# Patient Record
Sex: MALE | Race: WHITE | Employment: STUDENT | URBAN - NONMETROPOLITAN AREA
[De-identification: names, ages, dates, MRNs, and addresses within clinical notes are randomized per-mention and may not be internally consistent; named-entity substitution may affect disease eponyms.]

---
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
## 2021-01-05 RX ORDER — SYRING-NEEDL,DISP,INSUL,0.3 ML 31GX15/64"
SYRINGE, EMPTY DISPOSABLE MISCELLANEOUS
Qty: 100 EACH | Refills: 11 | Status: SHIPPED | OUTPATIENT
Start: 2021-01-05

## 2021-01-05 NOTE — TELEPHONE ENCOUNTER
Received fax from pharmacy requesting refill on pts medication(s). Pt was last seen in office on 12/9/2020  and has a follow up scheduled for 3/10/2021. Will send request to  Dr. Froilan Newton  for patient.      Requested Prescriptions     Pending Prescriptions Disp Refills    BD VEO INSULIN SYR U/F 1/2UNIT 31G X 15/64\" 0.3 ML MISC [Pharmacy Med Name: BD INS SYR 0.3/31G/6MM HU MIS] 100 each 0     Sig: USE AS DIRECTED

## 2021-01-14 ENCOUNTER — TELEPHONE (OUTPATIENT)
Dept: PRIMARY CARE CLINIC | Age: 21
End: 2021-01-14

## 2021-01-14 DIAGNOSIS — E10.9 TYPE 1 DIABETES MELLITUS WITHOUT COMPLICATION, WITH LONG-TERM CURRENT USE OF INSULIN (HCC): ICD-10-CM

## 2021-01-14 RX ORDER — INSULIN DEGLUDEC INJECTION 100 U/ML
23 INJECTION, SOLUTION SUBCUTANEOUS NIGHTLY
Qty: 15 ML | Refills: 5 | Status: SHIPPED | OUTPATIENT
Start: 2021-01-14 | End: 2021-01-15 | Stop reason: CLARIF

## 2021-01-14 NOTE — TELEPHONE ENCOUNTER
Received a denial from [address] [place] [address] on pts Tresiba. This medication cannot be approved because           I will send this to provider for further recommendations.

## 2021-01-15 RX ORDER — INSULIN GLARGINE 100 [IU]/ML
23 INJECTION, SOLUTION SUBCUTANEOUS NIGHTLY
Qty: 9 PEN | Refills: 3 | Status: SHIPPED | OUTPATIENT
Start: 2021-01-15 | End: 2022-03-30

## 2021-01-15 RX ORDER — INSULIN GLARGINE 100 [IU]/ML
23 INJECTION, SOLUTION SUBCUTANEOUS NIGHTLY
Qty: 9 PEN | Refills: 3 | Status: SHIPPED | OUTPATIENT
Start: 2021-01-15 | End: 2021-01-15 | Stop reason: SDUPTHER

## 2021-01-15 NOTE — TELEPHONE ENCOUNTER
We can order Basaglar insulin same number of units as ordered previously with Tresiba/Toujeo etc.  All the basal insulins will be the same dose

## 2021-01-15 NOTE — TELEPHONE ENCOUNTER
Please inquire as to what alternatives would be approved. Any basal insulin is fine with me.   It would be the same number of units daily

## 2021-01-15 NOTE — TELEPHONE ENCOUNTER
Called mother, alternative is    basaglar qwikpen    (just wanted to make sure you were good with that-still same dosage?)

## 2021-02-10 DIAGNOSIS — J45.20 MILD INTERMITTENT ASTHMA WITHOUT COMPLICATION: ICD-10-CM

## 2021-02-10 RX ORDER — MONTELUKAST SODIUM 10 MG/1
10 TABLET ORAL NIGHTLY
Qty: 90 TABLET | Refills: 3 | Status: SHIPPED | OUTPATIENT
Start: 2021-02-10 | End: 2022-03-18

## 2021-02-10 NOTE — TELEPHONE ENCOUNTER
Received fax from pharmacy requesting refill on pts medication(s). Pt was last seen in office on 12/9/2020  and has a follow up scheduled for 3/10/2021. Will send request to  Dr. Christiano Hernandez  for patient.      Requested Prescriptions     Pending Prescriptions Disp Refills    montelukast (SINGULAIR) 10 MG tablet [Pharmacy Med Name: Montelukast Sodium 10 MG Oral Tablet] 30 tablet 0     Sig: TAKE 1 TABLET BY MOUTH ONCE DAILY AT BEDTIME

## 2021-03-07 DIAGNOSIS — F41.9 ANXIETY: ICD-10-CM

## 2021-03-08 RX ORDER — ESCITALOPRAM OXALATE 10 MG/1
10 TABLET ORAL DAILY
Qty: 90 TABLET | Refills: 3 | Status: SHIPPED | OUTPATIENT
Start: 2021-03-08 | End: 2022-03-30 | Stop reason: SDUPTHER

## 2021-03-16 ENCOUNTER — TELEPHONE (OUTPATIENT)
Dept: PRIMARY CARE CLINIC | Age: 21
End: 2021-03-16

## 2021-03-16 DIAGNOSIS — E10.9 TYPE 1 DIABETES MELLITUS WITHOUT COMPLICATION, WITH LONG-TERM CURRENT USE OF INSULIN (HCC): Primary | ICD-10-CM

## 2021-03-16 NOTE — TELEPHONE ENCOUNTER
Mom called, needs lab orders sent to [facility]    Faxed through Epic to [phone] and LM for mom that these were faxed over

## 2021-03-19 NOTE — TELEPHONE ENCOUNTER
Mom called, they went to the lab at [facility] and was told they didn't have his orders. refaxed them again through Epic to [phone]    Called mother and advised her I was sorry about that, that I did send them on Tuesday and refaxed today as well.  Asked her to call and make sure they had them before they went again. (she said it is an hour away from their home)

## 2021-03-29 ENCOUNTER — TELEPHONE (OUTPATIENT)
Dept: PRIMARY CARE CLINIC | Age: 21
End: 2021-03-29

## 2021-03-29 ENCOUNTER — VIRTUAL VISIT (OUTPATIENT)
Dept: PRIMARY CARE CLINIC | Age: 21
End: 2021-03-29
Payer: COMMERCIAL

## 2021-03-29 DIAGNOSIS — J45.20 MILD INTERMITTENT ASTHMA WITHOUT COMPLICATION: ICD-10-CM

## 2021-03-29 DIAGNOSIS — E03.9 ACQUIRED HYPOTHYROIDISM: ICD-10-CM

## 2021-03-29 DIAGNOSIS — E10.9 TYPE 1 DIABETES MELLITUS WITHOUT COMPLICATION (HCC): Primary | ICD-10-CM

## 2021-03-29 PROCEDURE — 99214 OFFICE O/P EST MOD 30 MIN: CPT | Performed by: PEDIATRICS

## 2021-03-29 RX ORDER — INSULIN PUMP CONTROLLER, RF
1 EACH MISCELLANEOUS
Qty: 1 KIT | Refills: 0 | Status: SHIPPED | OUTPATIENT
Start: 2021-03-29 | End: 2021-04-27 | Stop reason: SDUPTHER

## 2021-03-29 ASSESSMENT — ENCOUNTER SYMPTOMS
COUGH: 0
BACK PAIN: 0
DIARRHEA: 0
SHORTNESS OF BREATH: 0
SORE THROAT: 0
VOMITING: 0
WHEEZING: 0
ABDOMINAL PAIN: 0
CHEST TIGHTNESS: 0
NAUSEA: 0

## 2021-03-29 NOTE — TELEPHONE ENCOUNTER
Mom called, he has an appt this afternoon for a VV. He had labs at [facility] last week and will need these for this appt. I called Sarina at [facility] and she is faxing them over if you can please watch for these and get them entered in.

## 2021-03-29 NOTE — PROGRESS NOTES
[facility], [address]  Phone [phone]   Fax [phone]      OFFICE VISIT: 3/29/2021    Susannah Neville Trejo-: 2000      HPI  Reason For Visit:  Juan Antonio Mirza is a 24 y.o. Diabetes    Patient presents via doxy. Me video conferencing on follow-up for type 1 diabetes. He states that he had labs performed at [facility].  Unfortunately, they did not send us any results. We have called for results but we have not yet received them. Diabetes Mellitus Type 1  Diet compliance:  compliant all of the time  Nutrition Consultation Needed:  no  Medication:              NovoLog insulin with carb counting and corrective factor              Tresiba insulin 23 units nightly  Medication compliance:  compliant all of the time  Weight trend: stable  Current exercise: yes - he is very active  Checking: 3-4 times daily  Home blood sugar records: fasting range: well controlled  Low BG:  no  Eye exam current (within one year): yes  Checking Feet regularly:  yes - no sores  ACE/ARB:  no  Aspirin: No:   Tobacco history: He  reports that he has never smoked. He has never used smokeless tobacco.    Lab Results   Component Value Date    LABA1C 9.4 2020    LABA1C 8.4 2020    LABA1C 8.5 (H) 2019     Lab Results   Component Value Date    CREATININE 0.91 2020     He states that he is ready to try an insulin pump. He is wanting to try OmniPod. He already has the [facility] system. He is using that regularly. The Omni pod would allow him to dose his insulin more accurately and more frequently without multiple needle sticks. Present settings are 250 for high and 80 for a low. We will want to bring these down at least from a high standpoint all the way down to around 150. He feels comfortable with his carb counting. He feels comfortable with his corrective factor. We did look at his basal rate today and it does seem to be relatively accurate for him.   We will order his OmniPod insulin pump today. They use : [facility] [address]    [address] [place] [address], [facility] [address]      [phone]    This is where they get his Dexcom through their insurance      Hypothyroidism:  Medication:   Synthroid 112 mcg daily  Medication compliance:  compliant most of the time  Patient is  taking his medication consistently on an empty stomach. Symptoms: none. Laboratory:  Lab Results   Component Value Date    TSH 2.910 12/01/2020    TSH 0.129 02/14/2020    TSH 0.110 (L) 07/27/2019     Lab Results   Component Value Date    T4FREE 1.45 12/01/2020    T4FREE 1.62 02/14/2020    T4FREE 1.74 (H) 07/27/2019        Asthma:  Medication:              Albuterol HFA 2 puffs every 6 hours as needed              Singulair 10 mg nightly              Zyrtec 10 mg daily for allergies  Symptoms: no recent problems.           vitals were not taken for this visit. There is no height or weight on file to calculate BMI.     I have reviewed the following with the Mr. Estelita Olmos   Lab Review  Orders Only on 12/16/2020   Component Date Value    TSH 12/01/2020 2.910     T4 Free 12/01/2020 1.45     WBC 12/01/2020 5.5     RBC 12/01/2020 4.99     Hemoglobin 12/01/2020 14.7     Hematocrit 12/01/2020 45     MCV 12/01/2020 90     MCH 12/01/2020 29.5     MCHC 12/01/2020 32.7     Platelets 51/25/6520 194     RDW 12/01/2020 12.5     Neutrophils % 12/01/2020 53     Lymphocytes % 12/01/2020 35     Monocytes % 12/01/2020 9     Eosinophils % 12/01/2020 2     Basophils % 12/01/2020 1     Neutrophils Absolute 12/01/2020 2.9     Lymphocytes Absolute 12/01/2020 1.9     Monocytes Absolute 12/01/2020 0.5     Eosinophils Absolute 12/01/2020 0.1     Basophils Absolute 12/01/2020 0.0     Sodium 12/01/2020 138     Chloride 12/01/2020 99     Potassium 12/01/2020 4.0     BUN 12/01/2020 7     CREATININE 12/01/2020 0.91     Glucose 12/01/2020 355     AST 12/01/2020 12     ALT 12/01/2020 10     Calcium 12/01/2020 9.6     Total Protein 12/01/2020 7.4     CO2 12/01/2020 24     Albumin 12/01/2020 4.8     Alkaline Phosphatase 12/01/2020 94     Total Bilirubin 12/01/2020 0.5     Gfr Calculated 12/01/2020 122     Cholesterol, Total 12/01/2020 134     HDL 12/01/2020 53     LDL Calculated 12/01/2020 69     Triglycerides 12/01/2020 55     Chol/HDL Ratio 12/01/2020 2.5     VLDL 12/01/2020 12     Microalbumin Creatinine * 12/01/2020 <7     Microalb, Ur 12/01/2020 <3     Creatinine, Urine 12/01/2020 42.9     Hemoglobin A1C 12/01/2020 9.4      Copies of these are in the chart. Current Outpatient Medications   Medication Sig Dispense Refill    escitalopram (LEXAPRO) 10 MG tablet Take 1 tablet by mouth daily 90 tablet 3    montelukast (SINGULAIR) 10 MG tablet Take 1 tablet by mouth nightly 90 tablet 3    insulin glargine (BASAGLAR KWIKPEN) 100 UNIT/ML injection pen Inject 23 Units into the skin nightly 9 pen 3    BD VEO INSULIN SYR U/F 1/2UNIT 31G X 15/64\" 0.3 ML MISC USE AS DIRECTED 100 each 11    Insulin Syringe-Needle U-100 (BD VEO INSULIN SYRINGE U/F) 31G X 15/64\" 0.3 ML MISC Use with insulin 100 each 11    levothyroxine (SYNTHROID) 112 MCG tablet Take 1 tablet by mouth daily 30 tablet 11    insulin aspart (NOVOLOG) 100 UNIT/ML injection vial Inject 20 Units into the skin 3 times daily (before meals) 2 vial 11    albuterol sulfate HFA (VENTOLIN HFA) 108 (90 Base) MCG/ACT inhaler Inhale 2 puffs into the lungs every 6 hours as needed for Wheezing 2 Inhaler 11    cetirizine (ZYRTEC) 10 MG tablet Take 1 tablet by mouth daily 90 tablet 3    FREESTYLE LITE strip USE 1 STRIP TO CHECK GLUCOSE SIX TIMES DAILY 200 each 5    glucagon 1 MG injection Follow package directions for low blood sugar.  1 kit 3    Insulin Syringe-Needle U-100 (BD INSULIN SYRINGE U/F) 31G X 5/16\" 0.3 ML MISC 1 each by Does not apply route daily 300 each 11    glucose monitoring kit (FREESTYLE) monitoring kit 1 kit by Does not apply route daily 1 kit 0    blood glucose monitor strips Test 3 times a day & as needed for symptoms of irregular blood glucose. 100 strip 11    blood glucose test strips (ONE TOUCH ULTRA TEST) strip Inject 1 each into the skin 6 times daily 200 strip 11     No current facility-administered medications for this visit. Allergies: Patient has no known allergies. Past Medical History:   Diagnosis Date    Asthma     Diabetes mellitus ([address]     Hypothyroid     Hypothyroidism (acquired)        Family History   Problem Relation Age of Onset    Diabetes Paternal Grandfather     Diabetes Maternal Grandmother        Past Surgical History:   Procedure Laterality Date    APPENDECTOMY         Social History     Tobacco Use    Smoking status: Never Smoker    Smokeless tobacco: Never Used   Substance Use Topics    Alcohol use: No     Alcohol/week: 0.0 standard drinks        Review of Systems   Constitutional: Negative for chills, fatigue and fever. HENT: Negative for congestion, ear pain and sore throat. Eyes: Negative for visual disturbance. Respiratory: Negative for cough, chest tightness, shortness of breath and wheezing. Cardiovascular: Negative for chest pain, palpitations and leg swelling. Gastrointestinal: Negative for abdominal pain, diarrhea, nausea and vomiting. Endocrine: Negative for polyuria. Bg running a little higher   Genitourinary: Negative for frequency and urgency. Musculoskeletal: Negative for back pain and neck pain. Skin: Negative for rash. Neurological: Negative for dizziness and headaches. Psychiatric/Behavioral: Negative for self-injury. The patient is not nervous/anxious. Physical Exam  Physical exam was not performed as this was a video teleconference visit using doxy. Me      ASSESSMENT      ICD-10-CM    1. Type 1 diabetes mellitus without complication (HCC)  P16.8    2. Acquired hypothyroidism  E03.9    3.  Mild intermittent asthma

## 2021-03-29 NOTE — PATIENT INSTRUCTIONS
Patient Education        Learning About Carbohydrate (Carb) Counting and Eating Out When You Have Diabetes  Why plan your meals? Meal planning can be a key part of managing diabetes. Planning meals and snacks with the right balance of carbohydrate, protein, and fat can help you keep your blood sugar at the target level you set with your doctor. You don't have to eat special foods. You can eat what your family eats, including sweets once in a while. But you do have to pay attention to how often you eat and how much you eat of certain foods. You may want to work with a dietitian or a certified diabetes educator. He or she can give you tips and meal ideas and can answer your questions about meal planning. This health professional can also help you reach a healthy weight if that is one of your goals. What should you know about eating carbs? Managing the amount of carbohydrate (carbs) you eat is an important part of healthy meals when you have diabetes. Carbohydrate is found in many foods. · Learn which foods have carbs. And learn the amounts of carbs in different foods. ? Bread, cereal, pasta, and rice have about 15 grams of carbs in a serving. A serving is 1 slice of bread (1 ounce), ½ cup of cooked cereal, or 1/3 cup of cooked pasta or rice. ? Fruits have 15 grams of carbs in a serving. A serving is 1 small fresh fruit, such as an apple or orange; ½ of a banana; ½ cup of cooked or canned fruit; ½ cup of fruit juice; 1 cup of melon or raspberries; or 2 tablespoons of dried fruit. ? Milk and no-sugar-added yogurt have 15 grams of carbs in a serving. A serving is 1 cup of milk or 2/3 cup of no-sugar-added yogurt. ? Starchy vegetables have 15 grams of carbs in a serving. A serving is ½ cup of mashed potatoes or sweet potato; 1 cup winter squash; ½ of a small baked potato; ½ cup of cooked beans; or ½ cup cooked corn or green peas.   · Learn how much carbs to eat each day and at each meal. A dietitian or CDE can teach you how to keep track of the amount of carbs you eat. This is called carbohydrate counting. · If you are not sure how to count carbohydrate grams, use the Plate Method to plan meals. It is a good, quick way to make sure that you have a balanced meal. It also helps you spread carbs throughout the day. ? Divide your plate by types of foods. Put non-starchy vegetables on half the plate, meat or other protein food on one-quarter of the plate, and a grain or starchy vegetable in the final quarter of the plate. To this you can add a small piece of fruit and 1 cup of milk or yogurt, depending on how many carbs you are supposed to eat at a meal.  · Try to eat about the same amount of carbs at each meal. Do not \"save up\" your daily allowance of carbs to eat at one meal.  · Proteins have very little or no carbs per serving. Examples of proteins are beef, chicken, turkey, fish, eggs, tofu, cheese, cottage cheese, and peanut butter. A serving size of meat is 3 ounces, which is about the size of a deck of cards. Examples of meat substitute serving sizes (equal to 1 ounce of meat) are 1/4 cup of cottage cheese, 1 egg, 1 tablespoon of peanut butter, and ½ cup of tofu. How can you eat out and still eat healthy? · Learn to estimate the serving sizes of foods that have carbohydrate. If you measure food at home, it will be easier to estimate the amount in a serving of restaurant food. · If the meal you order has too much carbohydrate (such as potatoes, corn, or baked beans), ask to have a low-carbohydrate food instead. Ask for a salad or green vegetables. · If you use insulin, check your blood sugar before and after eating out to help you plan how much to eat in the future. · If you eat more carbohydrate at a meal than you had planned, take a walk or do other exercise. This will help lower your blood sugar. What are some tips for eating healthy? · Limit saturated fat, such as the fat from meat and dairy products. This is a healthy choice because people who have diabetes are at higher risk of heart disease. So choose lean cuts of meat and nonfat or low-fat dairy products. Use olive or canola oil instead of butter or shortening when cooking. · Don't skip meals. Your blood sugar may drop too low if you skip meals and take insulin or certain medicines for diabetes. · Check with your doctor before you drink alcohol. Alcohol can cause your blood sugar to drop too low. Alcohol can also cause a bad reaction if you take certain diabetes medicines. Follow-up care is a key part of your treatment and safety. Be sure to make and go to all appointments, and call your doctor if you are having problems. It's also a good idea to know your test results and keep a list of the medicines you take. Where can you learn more? Go to https://Orthomimeticskeshiaeb.ArticleAlley. org and sign in to your [employer] account. Enter L748 in the NetDragon box to learn more about \"Learning About Carbohydrate (Carb) Counting and Eating Out When You Have Diabetes. \"     If you do not have an account, please click on the \"Sign Up Now\" link. Current as of: August 31, 2020               Content Version: 12.8  © 2006-2021 Healthwise, Incorporated. Care instructions adapted under license by [facility] ([facility]). If you have questions about a medical condition or this instruction, always ask your healthcare professional. [place] [address] any warranty or liability for your use of this information.

## 2021-03-31 LAB
ALBUMIN SERPL-MCNC: 4.8 G/DL
ALP BLD-CCNC: 97 U/L
ALT SERPL-CCNC: 10 U/L
ANION GAP SERPL CALCULATED.3IONS-SCNC: NORMAL MMOL/L
AST SERPL-CCNC: 16 U/L
AVERAGE GLUCOSE: NORMAL
BILIRUB SERPL-MCNC: 0.3 MG/DL (ref 0.1–1.4)
BUN BLDV-MCNC: 12 MG/DL
CALCIUM SERPL-MCNC: 9.9 MG/DL
CHLORIDE BLD-SCNC: 101 MMOL/L
CO2: 23 MMOL/L
CREAT SERPL-MCNC: 0.84 MG/DL
GFR CALCULATED: 127
GLUCOSE BLD-MCNC: 241 MG/DL
HBA1C MFR BLD: 8.4 %
POTASSIUM SERPL-SCNC: 4.6 MMOL/L
SODIUM BLD-SCNC: 139 MMOL/L
TOTAL PROTEIN: 7.8

## 2021-04-01 ENCOUNTER — TELEPHONE (OUTPATIENT)
Dept: PRIMARY CARE CLINIC | Age: 21
End: 2021-04-01

## 2021-04-01 NOTE — TELEPHONE ENCOUNTER
Called patient, spoke with: Parent(s) regarding the results of the patients most recent labs. I advised Parent(s) of Dr. Flory Card recommendations.    Patient did voice understanding

## 2021-04-01 NOTE — TELEPHONE ENCOUNTER
----- Message from DEVORA Marie sent at 3/31/2021 12:36 PM CDT -----  Please call patient and let them know results. Metabolic panel is normal except for elevated blood sugar 241  Hemoglobin A1c is 8.4. This is better than previous check still has lots of room for improvement. Please monitor blood sugars before each meal and bedtime and bring log to office visit for review.

## 2021-04-21 ENCOUNTER — TELEPHONE (OUTPATIENT)
Dept: PRIMARY CARE CLINIC | Age: 21
End: 2021-04-21

## 2021-04-21 NOTE — TELEPHONE ENCOUNTER
pts mom called and LM to check on his omnipod. She wants to know if there is anything that she should be doing? I do not see where this has been sent anywhere and scanned into his chart.

## 2021-04-21 NOTE — TELEPHONE ENCOUNTER
Katja called and left multiple messages to call back and discuss which DME facility she would like to use with no answer.

## 2021-04-27 DIAGNOSIS — E10.9 TYPE 1 DIABETES MELLITUS WITHOUT COMPLICATION (HCC): ICD-10-CM

## 2021-04-27 RX ORDER — INSULIN PUMP CONTROLLER, RF
1 EACH MISCELLANEOUS
Qty: 1 KIT | Refills: 0 | Status: SHIPPED | OUTPATIENT
Start: 2021-04-27

## 2021-05-10 ENCOUNTER — TELEPHONE (OUTPATIENT)
Dept: PRIMARY CARE CLINIC | Age: 21
End: 2021-05-10

## 2021-05-10 NOTE — TELEPHONE ENCOUNTER
[facility] called and was told that they didn't have any orders for omnipod and that I had the pharmacy dept, not DME office. She transferred me to [facility] in DME dept and she said that she didn't have any orders for Omnipod. The last orders were from March 3 or 4th for his Dexcom. DME dept fax is [phone]. She then transferred me to the intake dept [phone] ext [phone]. I spoke with Tyra Mcgee and she was able to find the order immediately. She created him a new account for the omnipod. She stated someone should reach out to them today or tomorrow      I called mom to update on all of this. I advised her if she hasn't heard from them to call me on Wed and I will follow up with them again.  She voiced understanding

## 2021-05-18 ENCOUNTER — TELEPHONE (OUTPATIENT)
Dept: PRIMARY CARE CLINIC | Age: 21
End: 2021-05-18

## 2021-05-18 NOTE — TELEPHONE ENCOUNTER
Mom called and LM that [facility] is supposed to be sending a request for PA for his insulin pump. Wants to know if we got it.

## 2021-05-20 ENCOUNTER — TELEPHONE (OUTPATIENT)
Dept: PRIMARY CARE CLINIC | Age: 21
End: 2021-05-20

## 2021-05-25 NOTE — TELEPHONE ENCOUNTER
Dear Ammy Sinha:  Gisselle Soriano received a prior authorization request from your health care provider  for coverage of OmniPod Dash 5 Pack Pods XX MISC for you. The request was denied  because: You do not meet the requirements of your plan. Your plan approved VF Formulary  Exception (VF Standard) criteria covers this drug when your doctor provides  documentation that you meet one of these conditions:  - You have a clinical condition for which there is no appropriate formulary alternative  - You need a form of the drug that is not on the formulary  - You tried the required number of preferred formulary alternatives on your formulary  first. These drugs did not work for you or you cannot take them. Your request has been denied based on the information we have. Examples (may vary  based on your plan) of alternatives to the requested drug are: V-Go Pump. Your  prescriber will be responsible for determining what alternative is appropriate for you. This list may not include all alternatives covered by your plan and may include  alternatives that require prior authorization. Please refer to your plan documents for a  complete list of alternatives. Requirement: you must have tried 3 alternatives if there  are 3 or more covered alternatives, 2 alternatives if there are 2 covered alternatives, or  1 alternative if there is only 1 covered alternative.

## 2021-06-08 ENCOUNTER — TELEPHONE (OUTPATIENT)
Dept: PRIMARY CARE CLINIC | Age: 21
End: 2021-06-08

## 2021-06-08 NOTE — TELEPHONE ENCOUNTER
Mom called and LM that [address] [place] [address] is just waiting on one more paper from  then they can ship it to them.

## 2021-06-11 ENCOUNTER — TELEPHONE (OUTPATIENT)
Dept: PRIMARY CARE CLINIC | Age: 21
End: 2021-06-11

## 2021-06-18 ENCOUNTER — TELEPHONE (OUTPATIENT)
Dept: PRIMARY CARE CLINIC | Age: 21
End: 2021-06-18

## 2021-06-18 NOTE — TELEPHONE ENCOUNTER
pts mom called and LM. He got his new insulin pump.  He was using 22 units of long acting insulin and she wants to know how that should transfer to his novolog and what his basal rate should be on his new pump

## 2021-06-22 NOTE — TELEPHONE ENCOUNTER
Called mother with recommendations from [address] [place] [address]. she voiced understanding.

## 2021-09-01 ENCOUNTER — TELEPHONE (OUTPATIENT)
Dept: PRIMARY CARE CLINIC | Age: 21
End: 2021-09-01

## 2022-01-24 DIAGNOSIS — E10.9 TYPE 1 DIABETES MELLITUS WITHOUT COMPLICATION (HCC): Primary | ICD-10-CM

## 2022-01-24 RX ORDER — BLOOD-GLUCOSE METER
KIT MISCELLANEOUS
Qty: 600 EACH | Refills: 3 | Status: SHIPPED | OUTPATIENT
Start: 2022-01-24

## 2022-01-24 NOTE — TELEPHONE ENCOUNTER
Received fax from pharmacy requesting refill on pts medication(s). Pt was last seen in office on 3/29/2021  and has a follow up scheduled for Visit date not found. Will send request to  Dr. [facility]  for patient.      Requested Prescriptions     Pending Prescriptions Disp Refills    blood glucose test strips (FREESTYLE LITE) strip [Pharmacy Med Name: FreeStyle Lite Test In Vitro Strip] 600 each 3     Sig: Use one strip to check glucose six times daily

## 2022-01-25 DIAGNOSIS — E10.9 TYPE 1 DIABETES MELLITUS WITHOUT COMPLICATION, WITH LONG-TERM CURRENT USE OF INSULIN (HCC): ICD-10-CM

## 2022-01-25 NOTE — TELEPHONE ENCOUNTER
Received fax from pharmacy requesting refill on pts medication(s). Pt was last seen in office on 3/29/2021  and has a follow up scheduled for Visit date not found. Will send request to  Dr. Deborah Miller  for authorization.      Requested Prescriptions     Pending Prescriptions Disp Refills    insulin aspart (NOVOLOG) 100 UNIT/ML injection vial 2 each 3     Sig: Inject 20 Units into the skin 3 times daily (before meals)

## 2022-01-27 ENCOUNTER — TELEPHONE (OUTPATIENT)
Dept: PRIMARY CARE CLINIC | Age: 22
End: 2022-01-27

## 2022-01-27 DIAGNOSIS — E10.9 TYPE 1 DIABETES MELLITUS WITHOUT COMPLICATION, WITH LONG-TERM CURRENT USE OF INSULIN (HCC): Primary | ICD-10-CM

## 2022-01-27 DIAGNOSIS — Z00.00 ROUTINE GENERAL MEDICAL EXAMINATION AT A HEALTH CARE FACILITY: ICD-10-CM

## 2022-01-27 DIAGNOSIS — E03.9 ACQUIRED HYPOTHYROIDISM: ICD-10-CM

## 2022-01-27 NOTE — TELEPHONE ENCOUNTER
----- Message from  Nancy sent at 1/27/2022  2:57 PM CST -----  Subject: Message to Provider    QUESTIONS  Information for Provider? Patient request blood work orders to check sugar  ---------------------------------------------------------------------------  --------------  CALL BACK INFO  What is the best way for the office to contact you? OK to leave message on   voicemail  Preferred Call Back Phone Number? [phone]  ---------------------------------------------------------------------------  --------------  SCRIPT ANSWERS  Relationship to Patient?  Self

## 2022-02-05 DIAGNOSIS — E03.9 ACQUIRED HYPOTHYROIDISM: ICD-10-CM

## 2022-02-07 RX ORDER — LEVOTHYROXINE SODIUM 112 UG/1
TABLET ORAL
Qty: 30 TABLET | Refills: 0 | Status: SHIPPED | OUTPATIENT
Start: 2022-02-07 | End: 2022-03-28

## 2022-02-07 NOTE — TELEPHONE ENCOUNTER
Received fax from pharmacy requesting refill on pts medication(s). Pt was last seen in office on 3/29/2021  and has a follow up scheduled for 3/4/2022. Will send request to  Dr. Reema Royal  for authorization.      Requested Prescriptions     Pending Prescriptions Disp Refills    EUTHYROX 112 MCG tablet [Pharmacy Med Name: Euthyrox 112 MCG Oral Tablet] 30 tablet 0     Sig: Take 1 tablet by mouth once daily

## 2022-02-21 ENCOUNTER — TELEPHONE (OUTPATIENT)
Dept: PRIMARY CARE CLINIC | Age: 22
End: 2022-02-21

## 2022-02-21 NOTE — TELEPHONE ENCOUNTER
----- Message from Kelsey Hughes sent at 2/21/2022  2:06 PM CST -----  Subject: Message to Provider    QUESTIONS  Information for Provider? Requesting blood work orders be sent to [facility], that's where he wants to get his blood work done.  ---------------------------------------------------------------------------  --------------  [address]  What is the best way for the office to contact you? OK to leave message on   voicemail  Preferred Call Back Phone Number? [phone]  ---------------------------------------------------------------------------  --------------  SCRIPT ANSWERS  Relationship to Patient? Third Party  Representative Name?  Sarina (mother)

## 2022-03-18 DIAGNOSIS — J45.20 MILD INTERMITTENT ASTHMA WITHOUT COMPLICATION: ICD-10-CM

## 2022-03-18 RX ORDER — MONTELUKAST SODIUM 10 MG/1
10 TABLET ORAL NIGHTLY
Qty: 90 TABLET | Refills: 3 | Status: SHIPPED | OUTPATIENT
Start: 2022-03-18 | End: 2022-03-30 | Stop reason: SDUPTHER

## 2022-03-18 NOTE — TELEPHONE ENCOUNTER
Requested Prescriptions     Pending Prescriptions Disp Refills    montelukast (SINGULAIR) 10 MG tablet [Pharmacy Med Name: Montelukast Sodium 10 MG Oral Tablet] 90 tablet 3     Sig: Take 1 tablet by mouth nightly

## 2022-03-27 DIAGNOSIS — E03.9 ACQUIRED HYPOTHYROIDISM: ICD-10-CM

## 2022-03-28 RX ORDER — LEVOTHYROXINE SODIUM 112 UG/1
112 TABLET ORAL DAILY
Qty: 30 TABLET | Refills: 5 | Status: SHIPPED | OUTPATIENT
Start: 2022-03-28

## 2022-03-28 NOTE — TELEPHONE ENCOUNTER
Received fax from pharmacy requesting refill on pts medication(s). Pt was last seen in office on 3/29/2021  and has a follow up scheduled for 3/30/2022. Will send request to  Dr. Gabino Wolff  for authorization.      Requested Prescriptions     Pending Prescriptions Disp Refills    levothyroxine (EUTHYROX) 112 MCG tablet [Pharmacy Med Name: Euthyrox 112 MCG Oral Tablet] 30 tablet 5     Sig: Take 1 tablet by mouth Daily

## 2022-03-30 ENCOUNTER — TELEMEDICINE (OUTPATIENT)
Dept: PRIMARY CARE CLINIC | Age: 22
End: 2022-03-30
Payer: COMMERCIAL

## 2022-03-30 DIAGNOSIS — E10.9 TYPE 1 DIABETES MELLITUS WITHOUT COMPLICATION, WITH LONG-TERM CURRENT USE OF INSULIN (HCC): Primary | ICD-10-CM

## 2022-03-30 DIAGNOSIS — J45.20 MILD INTERMITTENT ASTHMA WITHOUT COMPLICATION: ICD-10-CM

## 2022-03-30 DIAGNOSIS — E03.9 ACQUIRED HYPOTHYROIDISM: ICD-10-CM

## 2022-03-30 DIAGNOSIS — F41.9 ANXIETY: ICD-10-CM

## 2022-03-30 PROCEDURE — 99443 PR PHYS/QHP TELEPHONE EVALUATION 21-30 MIN: CPT | Performed by: PEDIATRICS

## 2022-03-30 RX ORDER — GLUCOSAMINE HCL/CHONDROITIN SU 500-400 MG
CAPSULE ORAL
Qty: 100 STRIP | Refills: 11 | Status: SHIPPED | OUTPATIENT
Start: 2022-03-30

## 2022-03-30 RX ORDER — ESCITALOPRAM OXALATE 10 MG/1
10 TABLET ORAL DAILY
Qty: 90 TABLET | Refills: 3 | Status: SHIPPED | OUTPATIENT
Start: 2022-03-30 | End: 2022-04-19

## 2022-03-30 RX ORDER — LISINOPRIL 2.5 MG/1
1 TABLET ORAL DAILY
COMMUNITY
Start: 2022-03-11

## 2022-03-30 RX ORDER — MONTELUKAST SODIUM 10 MG/1
10 TABLET ORAL NIGHTLY
Qty: 90 TABLET | Refills: 3 | Status: SHIPPED | OUTPATIENT
Start: 2022-03-30

## 2022-03-30 RX ORDER — CETIRIZINE HYDROCHLORIDE 10 MG/1
10 TABLET ORAL DAILY
Qty: 90 TABLET | Refills: 3 | Status: SHIPPED | OUTPATIENT
Start: 2022-03-30

## 2022-03-30 ASSESSMENT — ENCOUNTER SYMPTOMS
SORE THROAT: 0
NAUSEA: 0
CHEST TIGHTNESS: 0
DIARRHEA: 0
SHORTNESS OF BREATH: 0
VOMITING: 0
ABDOMINAL PAIN: 0
WHEEZING: 0
COUGH: 0
BACK PAIN: 0

## 2022-03-30 NOTE — PROGRESS NOTES
[address] [facility], [address]  Phone [phone]   Fax [phone]      OFFICE VISIT: 3/30/2022    Meibishop Trejo-: 2000      HPI  Reason For Visit:  Moshe Ruby is a 22 y.o. Diabetes (BG staying well controlled. ), Discuss Labs (completed at [facility]), and Medication Refill (test strips, singulair, zyrtec, lexapro, novolog)    Patient presents on follow-up for type 1 diabetes mellitus. This is a telephone conference visit. He also has multiple other health issues. I have not seen him in this office in over a year. He did have some labs performed at [facility] on 3/15/2022. Unfortunately, they did not perform the hemoglobin A1c. It appears that this visit was an acute visit in the emergency department for what appears to be abdominal pain. Diabetes Mellitus Type 2  He is using his dexcom and omnipod. Diet compliance:  compliant most of the time  Nutrition Consultation Needed:  no  Medication:   Carb ratio 1:5   CF high 54:1   Basal 0-6= 0.6,  1.45 from 6AM-2400   Novolog insulin   Medication compliance:  compliant most of the time  Weight trend: stable  Current exercise: yes - he is very active. Checking: frequently with Dexcom  Home blood sugar records: fasting range: consistently <200  Low BG:  Very rarely  Eye exam current (within one year): yes  Checking Feet regularly:  yes - no sores  ACE/ARB:  yes - lisinopril 2.5mg daily  Aspirin: No:   Tobacco history: He  reports that he has never smoked.  He has never used smokeless tobacco.    Lab Results   Component Value Date    LABA1C 8.4 2021    LABA1C 9.4 2020    LABA1C 8.4 2020     Lab Results   Component Value Date    CREATININE 0.84 2021       Hypertension:   BP today was   BP Readings from Last 1 Encounters:   20 126/84      Recent BP readings:    BP Readings from Last 3 Encounters:   20 126/84   19 110/80   01/10/19 128/88 (83 %, Z = 0.95 /  98 %, Z = 2.05)*     *BP percentiles are based on the 2017 AAP Clinical Practice Guideline for boys     Medication   Lisinopril 2.5mg daily  Medication compliance:  compliant most of the time  Home blood pressure monitoring: No.    He is somewhat adherent to a low sodium diet. Symptoms: none  Laboratory:  Lab Results   Component Value Date    BUN 12 03/26/2021    CREATININE 0.84 03/26/2021       Hypothyroidism:  Medication:   Euthyrox 112 mcg daily  Medication compliance:  compliant most of the time  Patient is  taking his medication consistently on an empty stomach. Symptoms: none. Laboratory:  Lab Results   Component Value Date    TSH 2.910 12/01/2020    TSH 0.129 02/14/2020    TSH 0.110 (L) 07/27/2019     Lab Results   Component Value Date    T4FREE 1.45 12/01/2020    T4FREE 1.62 02/14/2020    T4FREE 1.74 (H) 07/27/2019       Asthma:  Medication:   Singulair 10 mg nightly   Ventolin HFA 2 puffs every 6 hours as needed  Symptoms: Well controlled at this time       vitals were not taken for this visit. There is no height or weight on file to calculate BMI. I have reviewed the following with the Mr. Mejía Ezekiel   Lab Review  No visits with results within 6 Month(s) from this visit. Latest known visit with results is:   Orders Only on 03/31/2021   Component Date Value    Sodium 03/26/2021 139     Chloride 03/26/2021 101     Potassium 03/26/2021 4.6     BUN 03/26/2021 12     CREATININE 03/26/2021 0.84     Glucose 03/26/2021 241     AST 03/26/2021 16     ALT 03/26/2021 10     Calcium 03/26/2021 9.9     Total Protein 03/26/2021 7.8     CO2 03/26/2021 23     Albumin 03/26/2021 4.8     Alkaline Phosphatase 03/26/2021 97     Total Bilirubin 03/26/2021 0.3     Gfr Calculated 03/26/2021 127     Hemoglobin A1C 03/26/2021 8.4      Copies of these are in the chart.   His chest x-ray set    Current Outpatient Medications   Medication Sig Dispense Refill    lisinopril (PRINIVIL;ZESTRIL) 2.5 MG tablet Take 1 tablet by mouth daily      insulin aspart (NOVOLOG) 100 UNIT/ML injection vial Use with omnipod system 120 mL 3    escitalopram (LEXAPRO) 10 MG tablet Take 1 tablet by mouth daily 90 tablet 3    cetirizine (ZYRTEC) 10 MG tablet Take 1 tablet by mouth daily 90 tablet 3    montelukast (SINGULAIR) 10 MG tablet Take 1 tablet by mouth nightly 90 tablet 3    blood glucose monitor strips Test 3 times a day & as needed for symptoms of irregular blood glucose. 100 strip 11    levothyroxine (EUTHYROX) 112 MCG tablet Take 1 tablet by mouth Daily 30 tablet 5    blood glucose test strips (FREESTYLE LITE) strip Use one strip to check glucose six times daily 600 each 3    Insulin Disposable Pump (OMNIPOD 10 PACK) MISC Use to deliver insulin via pump. Change every 3 days. 30 each 3    Insulin Disposable Pump (OMNIPOD STARTER) KIT 1 kit by Does not apply route every 3 days 1 kit 0    BD VEO INSULIN SYR U/F 1/2UNIT 31G X 15/64\" 0.3 ML MISC USE AS DIRECTED 100 each 11    albuterol sulfate HFA (VENTOLIN HFA) 108 (90 Base) MCG/ACT inhaler Inhale 2 puffs into the lungs every 6 hours as needed for Wheezing 2 Inhaler 11    glucagon 1 MG injection Follow package directions for low blood sugar. 1 kit 3    glucose monitoring kit (FREESTYLE) monitoring kit 1 kit by Does not apply route daily 1 kit 0     No current facility-administered medications for this visit. Allergies: Patient has no known allergies.      Past Medical History:   Diagnosis Date    Asthma     Diabetes mellitus ([facility] [address]     Hypothyroid     Hypothyroidism (acquired)        Family History   Problem Relation Age of Onset    Diabetes Paternal Grandfather     Diabetes Maternal Grandmother        Past Surgical History:   Procedure Laterality Date    APPENDECTOMY         Social History     Tobacco Use    Smoking status: Never Smoker    Smokeless tobacco: Never Used   Substance Use Topics    Alcohol use: No     Alcohol/week: 0.0 standard drinks Review of Systems   Constitutional: Negative for chills, fatigue and fever. HENT: Negative for congestion, ear pain and sore throat. Eyes: Negative for visual disturbance. Respiratory: Negative for cough, chest tightness, shortness of breath and wheezing. Cardiovascular: Negative for chest pain, palpitations and leg swelling. Gastrointestinal: Negative for abdominal pain, diarrhea, nausea and vomiting. Endocrine: Negative for polyuria. Bg running a little higher   Genitourinary: Negative for frequency and urgency. Musculoskeletal: Negative for back pain and neck pain. Skin: Negative for rash. Neurological: Negative for dizziness and headaches. Psychiatric/Behavioral: Negative for self-injury. The patient is not nervous/anxious. Physical Exam  Physical exam was not performed today as this was a telephone conference visit      ASSESSMENT      ICD-10-CM    1. Type 1 diabetes mellitus without complication, with long-term current use of insulin ([facility])  E10.9 insulin aspart (NOVOLOG) 100 UNIT/ML injection vial     CBC with Auto Differential     Comprehensive Metabolic Panel     Hemoglobin A1C     Lipid Panel     Microalbumin / Creatinine Urine Ratio   2. Anxiety  F41.9 escitalopram (LEXAPRO) 10 MG tablet     T4, Free     TSH   3. Mild intermittent asthma without complication  O17.93 cetirizine (ZYRTEC) 10 MG tablet     montelukast (SINGULAIR) 10 MG tablet     CBC with Auto Differential     Comprehensive Metabolic Panel   4. Acquired hypothyroidism  E03.9 T4, Free     TSH         PLAN    1. Type 1 diabetes mellitus without complication, with long-term current use of insulin ([facility])  We discussed that he may need to adjust his parameters on his pump. His basal rate is most likely too high during the day. His correction factor is most likely too low. We can monitor his carb ratio.   We discussed how to check all of these parameters and make adjustments to his pump  - insulin aspart (NOVOLOG) 100 UNIT/ML injection vial; Use with omnipod system  Dispense: 120 mL; Refill: 3    2. Anxiety  Continue with Lexapro 10 mg daily. This has been effective at managing his anxiety  - escitalopram (LEXAPRO) 10 MG tablet; Take 1 tablet by mouth daily  Dispense: 90 tablet; Refill: 3    3. Mild intermittent asthma without complication  Asthma is well controlled on present medication regimen. We will continue the same  - cetirizine (ZYRTEC) 10 MG tablet; Take 1 tablet by mouth daily  Dispense: 90 tablet; Refill: 3  - montelukast (SINGULAIR) 10 MG tablet; Take 1 tablet by mouth nightly  Dispense: 90 tablet; Refill: 3    4. Acquired hypothyroidism  We will need to recheck thyroid in the near future as well      Orders Placed This Encounter   Procedures    CBC with Auto Differential    Comprehensive Metabolic Panel    Hemoglobin A1C    Lipid Panel    Microalbumin / Creatinine Urine Ratio    T4, Free    TSH        Return in about 3 months (around 6/30/2022) for 30. Due to patients co-morbidities and risk for potential exposure of COVID 19 pandemic. Patient was contacted and agreed to proceed with a telephone virtual visit. The risks and benefits of converting to a telephone virtual visit were discussed in light of the current infectious disease epidemic. Patient also understood that insurance coverage and co-pays are up to their individual insurance plans. Provider performed history of present illness and review of systems. Diagnosis and treatment plan was discussed with patient. Pharmacy of choice was reviewed along with past medical history, medication allergies, and current medications. Education provided to patient or patient parents/guardian with current illness diagnosis as well as when to seek additional healthcare due to changing or for worsening symptoms. Patient voiced understanding. 30 minutes were spent on the phone with patient.     Apryl Faye, was evaluated through a synchronous (real-time) audio-video encounter. The patient (or guardian if applicable) is aware that this is a billable service, which includes applicable co-pays. This Virtual Visit was conducted with patient's (and/or legal guardian's) consent. The visit was conducted pursuant to the emergency declaration under the [address] [place] [address], [address] [place] [address] authority and the [employer] Act. Patient identification was verified, and a caregiver was present when appropriate. The patient was located at home in a state where the provider was licensed to provide care. Total time spent for this encounter: 30+m    --SHARMAINE Page DO on 3/30/2022 at 7:34 PM    An electronic signature was used to authenticate this note.

## 2022-04-01 ENCOUNTER — TELEPHONE (OUTPATIENT)
Dept: PRIMARY CARE CLINIC | Age: 22
End: 2022-04-01

## 2022-04-01 DIAGNOSIS — E10.9 TYPE 1 DIABETES MELLITUS WITHOUT COMPLICATION, WITH LONG-TERM CURRENT USE OF INSULIN (HCC): ICD-10-CM

## 2022-04-19 DIAGNOSIS — F41.9 ANXIETY: ICD-10-CM

## 2022-04-19 RX ORDER — ESCITALOPRAM OXALATE 10 MG/1
10 TABLET ORAL DAILY
Qty: 90 TABLET | Refills: 0 | Status: SHIPPED | OUTPATIENT
Start: 2022-04-19

## 2022-04-19 NOTE — TELEPHONE ENCOUNTER
Received fax from pharmacy requesting refill on pts medication(s). Pt was last seen in office on 3/30/2022  and has a follow up scheduled for Visit date not found. Will send request to  Dr. Marcia Christianson  for authorization.      Requested Prescriptions     Pending Prescriptions Disp Refills    escitalopram (LEXAPRO) 10 MG tablet [Pharmacy Med Name: Escitalopram Oxalate 10 MG Oral Tablet] 90 tablet 0     Sig: Take 1 tablet by mouth daily

## 2022-08-09 ENCOUNTER — TELEPHONE (OUTPATIENT)
Dept: PRIMARY CARE CLINIC | Age: 22
End: 2022-08-09

## 2022-08-09 NOTE — TELEPHONE ENCOUNTER
Nikki Neal with [facility] called to check on the status of his omnipod rx form. Have you seen this?

## 2022-08-15 DIAGNOSIS — E10.9 TYPE 1 DIABETES MELLITUS WITHOUT COMPLICATION, WITH LONG-TERM CURRENT USE OF INSULIN (HCC): ICD-10-CM

## 2022-08-15 NOTE — TELEPHONE ENCOUNTER
Pts mom called, she said that Nahum Esquivel has been trying to reach us for his new script for his supplies.  She said it has been held up for 1.5 weeks

## 2022-08-17 NOTE — TELEPHONE ENCOUNTER
Called and spoke with pts mom. Advised her that he is overdue for labs. She said he had labs done at [facility]. I told her that I didn't have those. I told her I had the ones from [facility], but they were cmp and cbc. She said she was pretty sure he had an a1c and asked me to look at your note. It states that unfortunately he didn't have an A1c done. I told mom I could send his orders wherever and she said that she would have to find a day that they could go together bc he almost passes out. I asked if she wanted me to send to [facility] and she said no, [facility] he trusts them. I told her that she could call and see if they might be able to go through the ER on the weekend to get this done. For now, he will need to use vials and will need lantus and novolog. He will also  need syringes. She wanted me to ask you what doses she needs to give him.  Does not want pens bc says he has to use an injector        Lab orders faxed again to [address]

## 2022-08-17 NOTE — TELEPHONE ENCOUNTER
Please make sure that he does not run out of insulin. If he needs any refills of insulin, please fill as requested. We cannot ever withhold insulin.

## 2022-08-18 NOTE — TELEPHONE ENCOUNTER
Insulin dose will be the same meals and correction. We can look at what his basal rate is and calculate that total over 24 hours and that would be the dose of Lantus.

## 2022-08-18 NOTE — TELEPHONE ENCOUNTER
I wouldn't deny anyone insulin. [facility] will not dispense his supplies for his omnipod bc he hasnt had his A1C in over on year. They have to have that to file his insurance. Mom was asking me to ask you how much novolog and lantus she needs to give him while they are waiting to get labs done for us to send to [facility].     I am not showing Lantus on his current med list.

## 2022-08-21 RX ORDER — INSULIN ASPART 100 [IU]/ML
20 INJECTION, SOLUTION INTRAVENOUS; SUBCUTANEOUS 3 TIMES DAILY
Qty: 20 ML | Refills: 3 | Status: SHIPPED | OUTPATIENT
Start: 2022-08-21

## 2022-08-21 RX ORDER — BLOOD SUGAR DIAGNOSTIC
1 STRIP MISCELLANEOUS DAILY
Qty: 300 EACH | Refills: 11 | Status: SHIPPED | OUTPATIENT
Start: 2022-08-21

## 2022-08-24 ENCOUNTER — TELEPHONE (OUTPATIENT)
Dept: PRIMARY CARE CLINIC | Age: 22
End: 2022-08-24

## 2022-08-24 NOTE — TELEPHONE ENCOUNTER
Mother calling to let us know that they are going to [facility] this evening to have Rodrigo's A1C drawn. Just FYI so we could be watching for it. They will be out of dexcom supplies tomorrow.

## 2022-08-24 NOTE — TELEPHONE ENCOUNTER
Pts mom called, she said that insurance will not cover Lantus, they need it changed to [facility]

## 2022-08-25 NOTE — TELEPHONE ENCOUNTER
Please, get the results from [facility] and we can order his Dexcom supplies as previously

## 2022-08-26 NOTE — TELEPHONE ENCOUNTER
Looked and we still do not have these and they are not in the lab folder. Will call again Monday as they are already closed for today.

## 2022-08-29 DIAGNOSIS — E03.9 ACQUIRED HYPOTHYROIDISM: ICD-10-CM

## 2022-08-29 DIAGNOSIS — E10.9 TYPE 1 DIABETES MELLITUS WITHOUT COMPLICATION, WITH LONG-TERM CURRENT USE OF INSULIN (HCC): Primary | ICD-10-CM

## 2022-08-29 DIAGNOSIS — R53.83 FATIGUE, UNSPECIFIED TYPE: ICD-10-CM

## 2022-08-29 LAB
ALBUMIN SERPL-MCNC: 4.8 G/DL
ALP BLD-CCNC: 77 U/L
ALT SERPL-CCNC: 12 U/L
ANION GAP SERPL CALCULATED.3IONS-SCNC: ABNORMAL MMOL/L
AST SERPL-CCNC: 16 U/L
AVERAGE GLUCOSE: ABNORMAL
BASOPHILS ABSOLUTE: 0 /ΜL
BASOPHILS RELATIVE PERCENT: 1 %
BILIRUB SERPL-MCNC: 0.4 MG/DL (ref 0.1–1.4)
BUN BLDV-MCNC: 8 MG/DL
CALCIUM SERPL-MCNC: 9.5 MG/DL
CHLORIDE BLD-SCNC: 104 MMOL/L
CHOLESTEROL, TOTAL: 137 MG/DL
CHOLESTEROL/HDL RATIO: 3.2
CO2: 23 MMOL/L
CREAT SERPL-MCNC: 0.88 MG/DL
CREATININE, URINE: 104.8
EOSINOPHILS ABSOLUTE: 0.1 /ΜL
EOSINOPHILS RELATIVE PERCENT: 2 %
GFR CALCULATED: 125
GLUCOSE BLD-MCNC: 136 MG/DL
HBA1C MFR BLD: 8.1 %
HCT VFR BLD CALC: 45 % (ref 41–53)
HDLC SERPL-MCNC: 43 MG/DL (ref 35–70)
HEMOGLOBIN: 14.3 G/DL (ref 13.5–17.5)
LDL CHOLESTEROL CALCULATED: 75 MG/DL (ref 0–160)
LYMPHOCYTES ABSOLUTE: 1.7 /ΜL
LYMPHOCYTES RELATIVE PERCENT: 24 %
MCH RBC QN AUTO: 28.6 PG
MCHC RBC AUTO-ENTMCNC: 31.8 G/DL
MCV RBC AUTO: 90 FL
MICROALBUMIN/CREAT 24H UR: <3 MG/G{CREAT}
MICROALBUMIN/CREAT UR-RTO: <3
MONOCYTES ABSOLUTE: 0.5 /ΜL
MONOCYTES RELATIVE PERCENT: 7 %
NEUTROPHILS ABSOLUTE: 4.8 /ΜL
NEUTROPHILS RELATIVE PERCENT: 66 %
NONHDLC SERPL-MCNC: NORMAL MG/DL
PDW BLD-RTO: 13.2 %
PLATELET # BLD: 254 K/ΜL
PMV BLD AUTO: NORMAL FL
POTASSIUM SERPL-SCNC: 4 MMOL/L
RBC # BLD: 5 10^6/ΜL
SODIUM BLD-SCNC: 142 MMOL/L
T4 FREE: 1.33
TOTAL PROTEIN: 7.4
TRIGL SERPL-MCNC: 101 MG/DL
TSH SERPL DL<=0.05 MIU/L-ACNC: 2.4 UIU/ML
VLDLC SERPL CALC-MCNC: 19 MG/DL
WBC # BLD: 7.2 10^3/ML

## 2022-08-30 ENCOUNTER — TELEPHONE (OUTPATIENT)
Dept: PRIMARY CARE CLINIC | Age: 22
End: 2022-08-30

## 2022-08-30 NOTE — TELEPHONE ENCOUNTER
----- Message from [address] [facility],[address], DO sent at 8/30/2022  1:02 PM CDT -----  Your WBC, (infection fighting ability) Hgb and Hct, (oxygen carrying cells) are normal; as is your percentage of each cell type. Your metabolic profile is normal.  This includes kidney and liver functions as well as electrolytes. Blood sugar is 136 at the time of the lab draw. Hemoglobin A1c is 8.1 which is higher than what we would desire it to be. We can work with you to try to get this down to a lower level. Lipids are controlled. There is no significant protein excretion in the urine.   Thyroid values are normal.

## 2022-08-31 ENCOUNTER — TELEPHONE (OUTPATIENT)
Dept: PRIMARY CARE CLINIC | Age: 22
End: 2022-08-31

## 2022-08-31 NOTE — TELEPHONE ENCOUNTER
----- Message from Eh Charly sent at 8/31/2022  9:22 AM CDT -----  Subject: Message to Provider     QUESTIONS  Information for Provider? Mother of current PT, Shun Zepeda, they are 24 and   have no communication release signed. Explained we could not give phi to   her, she asked for us to send a message. PT's blood work was sent in, he's   getting a dexcom but it's not in yet, he needs long acting insulin but   they sent the wrong script to the pharmacy.  ---------------------------------------------------------------------------  --------------  [id number]  [phone]; OK to leave message on voicemail  ---------------------------------------------------------------------------  --------------  SCRIPT ANSWERS  Relationship to Patient? Self      Lantus was sent to pharmacy on 8/21/22    insulin glargine (LANTUS;BASAGLAR) 100 UNIT/ML injection pen 5 pen 3 8/21/2022     Sig - Route: Inject 30 Units into the skin nightly - SubCUTAneous    Sent to pharmacy as:  Insulin Glargine 100 UNIT/ML Subcutaneous Solution Pen-injector (LANTUS;BASAGLAR)    E-Prescribing Status: Receipt confirmed by pharmacy (8/21/2022 12:26 PM CDT)

## 2022-09-07 NOTE — TELEPHONE ENCOUNTER
Called patient, spoke with: Parent(s) regarding the results of the patients most recent labs. I advised Parent(s) of Dr. Neal Ronquillo recommendations.    Parent(s) did voice understanding

## 2022-09-30 NOTE — TELEPHONE ENCOUNTER
Called and spoke with Rober Sierra at [facility] and she will fax them over to us Render Risk Assessment In Note?: no Detail Level: Zone Comment: Hx of psoriasis in paper chart. Discussed management not curative. Patient happy with control and management, declines change in regimen

## 2023-04-23 DIAGNOSIS — J45.20 MILD INTERMITTENT ASTHMA WITHOUT COMPLICATION: ICD-10-CM

## 2023-04-24 RX ORDER — MONTELUKAST SODIUM 10 MG/1
10 TABLET ORAL NIGHTLY
Qty: 90 TABLET | Refills: 3 | Status: SHIPPED | OUTPATIENT
Start: 2023-04-24

## 2023-04-24 NOTE — TELEPHONE ENCOUNTER
Received fax from pharmacy requesting refill on pts medication(s). Pt was last seen in office on 03/30/2022and has a follow up scheduled for Visit date not found. Will send request to  Dr. Tiesha Nava  for authorization.      Requested Prescriptions     Pending Prescriptions Disp Refills    montelukast (SINGULAIR) 10 MG tablet [Pharmacy Med Name: Montelukast Sodium 10 MG Oral Tablet] 90 tablet 3     Sig: Take 1 tablet by mouth nightly

## 2023-06-12 NOTE — PROGRESS NOTES
[address] [facility], [address]  Phone [phone]   Fax [phone]      OFFICE VISIT: 2020    Mary Trejo-: 2000      HPI  Reason For Visit:  Melquiades Lam is a 24 y.o. Diabetes    Patient presents via doxy. Me video conferencing on follow-up for type 1 diabetes. He had bloodwork done at [facility] recently. Diabetes Mellitus Type 2    Diet compliance:  compliant all of the time  Nutrition Consultation Needed:  no  Medication:   NovoLog insulin with carb counting and corrective factor   Tresiba insulin 23 units nightly  Medication compliance:  compliant all of the time  Weight trend: stable  Current exercise: yes - he is very active  Checking: 3-4 times daily  Home blood sugar records: fasting range: well controlled  Low BG:  no  Eye exam current (within one year): yes  Checking Feet regularly:  yes - no sores  ACE/ARB:  no  Aspirin: No:   Tobacco history: He  reports that he has never smoked. He has never used smokeless tobacco.    Lab Results   Component Value Date    LABA1C 8.4 2020    LABA1C 8.5 (H) 2019    LABA1C 8.6 2019     Lab Results   Component Value Date    CREATININE 0.79 2020       Hypothyroidism:  Medication:   Synthroid 112 mcg daily  Medication compliance:  compliant most of the time  Patient is  taking his medication consistently on an empty stomach. Symptoms: none. Laboratory:  Lab Results   Component Value Date    TSH 0.129 2020    TSH 0.110 (L) 2019    TSH 0.261 2018     Lab Results   Component Value Date    T4FREE 1.62 2020    T4FREE 1.74 (H) 2019       Asthma:  Medication:   Albuterol HFA 2 puffs every 6 hours as needed   Singulair 10 mg nightly   Zyrtec 10 mg daily for allergies  Symptoms: no recent problems. vitals were not taken for this visit. There is no height or weight on file to calculate BMI.     I have reviewed the following with the Alex Yefri Nancy   Lab Review  No visits with Normal mammogram. Plan for repeat in 1 year. - Dr. Katy Eng results within 6 Month(s) from this visit. Latest known visit with results is:   Orders Only on 02/17/2020   Component Date Value    TSH 02/14/2020 0.129     T4 Free 02/14/2020 1.62     WBC 02/14/2020 5.2     RBC 02/14/2020 5.35     Hemoglobin 02/14/2020 15.4     Hematocrit 02/14/2020 47.2     MCV 02/14/2020 88     MCH 02/14/2020 28.8     MCHC 02/14/2020 32.6     Platelets 17/04/4458 223     RDW 02/14/2020 12.5     Neutrophils % 02/14/2020 54     Lymphocytes % 02/14/2020 35     Monocytes % 02/14/2020 8     Eosinophils % 02/14/2020 2     Basophils % 02/14/2020 1     Neutrophils Absolute 02/14/2020 2.9     Lymphocytes Absolute 02/14/2020 1.8     Monocytes Absolute 02/14/2020 0.4     Eosinophils Absolute 02/14/2020 0.1     Basophils Absolute 02/14/2020 0.0     Sodium 02/14/2020 138     Chloride 02/14/2020 100     Potassium 02/14/2020 4.2     BUN 02/14/2020 8     CREATININE 02/14/2020 0.79     Glucose 02/14/2020 203     AST 02/14/2020 14     ALT 02/14/2020 11     Calcium 02/14/2020 9.7     Total Protein 02/14/2020 7.6     CO2 02/14/2020 24     Alb 02/14/2020 4.6     Alkaline Phosphatase 02/14/2020 98     Total Bilirubin 02/14/2020 0.5     Cholesterol, Total 02/14/2020 135     HDL 02/14/2020 50     LDL Calculated 02/14/2020 69     Triglycerides 02/14/2020 79     Chol/HDL Ratio 02/14/2020 2.7     VLDL 02/14/2020 16     Microalbumin Creatinine * 02/14/2020 <8     Microalb, Ur 02/14/2020 <3     Creatinine, Urine 02/14/2020 37.4     Hemoglobin A1C 02/14/2020 8.4      Copies of these are in the chart.     Current Outpatient Medications   Medication Sig Dispense Refill    Insulin Syringe-Needle U-100 (BD VEO INSULIN SYRINGE U/F) 31G X 15/64\" 0.3 ML MISC Use with insulin 100 each 11    levothyroxine (SYNTHROID) 112 MCG tablet Take 1 tablet by mouth daily 30 tablet 11    Insulin Syringe-Needle U-100 (BD VEO INSULIN SYR U/F 1/2UNIT) 31G X 15/64\" 0.3 ML MISC USE AS DIRECTED 100 each encounter. Return in about 3 months (around 3/9/2021) for 30. Bhaskar Lei is a 24 y.o. male being evaluated by a Virtual Visit (video visit) encounter to address concerns as mentioned above. A caregiver was present when appropriate. Due to this being a TeleHealth encounter (During TVUXK-41 public health emergency), evaluation of the following organ systems was limited: Vitals/Constitutional/EENT/Resp/CV/GI//MS/Neuro/Skin/Heme-Lymph-Imm. Pursuant to the emergency declaration under the [address] [place] [address], [address] [place] [address] authority and the [employer] Act, this Virtual Visit was conducted with patient's (and/or legal guardian's) consent, to reduce the patient's risk of exposure to COVID-19 and provide necessary medical care. The patient (and/or legal guardian) has also been advised to contact this office for worsening conditions or problems, and seek emergency medical treatment and/or call 911 if deemed necessary. Patient identification was verified at the start of the visit: Yes    Total time spent for this encounter: 25m    Services were provided through a video synchronous discussion virtually to substitute for in-person clinic visit. Patient and provider were located at their individual homes. --B. Wilbur [place], DO on 12/9/2020 at 6:06 PM    An electronic signature was used to authenticate this note.

## 2024-03-16 DIAGNOSIS — J45.20 MILD INTERMITTENT ASTHMA WITHOUT COMPLICATION: ICD-10-CM

## 2024-03-18 RX ORDER — MONTELUKAST SODIUM 10 MG/1
10 TABLET ORAL NIGHTLY
Qty: 30 TABLET | Refills: 0 | Status: SHIPPED | OUTPATIENT
Start: 2024-03-18

## 2024-03-18 NOTE — TELEPHONE ENCOUNTER
Received fax from pharmacy requesting refill on pts medication(s). Pt was last seen in office on 03/30/2022 and has a follow up scheduled for Visit date not found. Will send request to  Dr. Bradford  for authorization.     Requested Prescriptions     Pending Prescriptions Disp Refills    montelukast (SINGULAIR) 10 MG tablet [Pharmacy Med Name: Montelukast Sodium 10 MG Oral Tablet] 30 tablet 0     Sig: Take 1 tablet by mouth nightly